# Patient Record
Sex: MALE | URBAN - METROPOLITAN AREA
[De-identification: names, ages, dates, MRNs, and addresses within clinical notes are randomized per-mention and may not be internally consistent; named-entity substitution may affect disease eponyms.]

---

## 2020-07-03 ENCOUNTER — NURSE ONLY (OUTPATIENT)
Dept: PRIMARY CARE CLINIC | Age: 45
End: 2020-07-03

## 2020-07-03 PROCEDURE — 99211 OFF/OP EST MAY X REQ PHY/QHP: CPT | Performed by: NURSE PRACTITIONER

## 2020-07-03 NOTE — PROGRESS NOTES
Bernell Kehr received a viral test for COVID-19. They were educated on isolation and quarantine as appropriate. For any symptoms, they were directed to seek care from their PCP, given contact information to establish with a doctor, directed to an urgent care or the emergency room.

## 2020-07-06 ENCOUNTER — TELEPHONE (OUTPATIENT)
Dept: PRIMARY CARE CLINIC | Age: 45
End: 2020-07-06

## 2020-07-06 NOTE — TELEPHONE ENCOUNTER
Patient left message for results  Results not available at this time  Returned call and left a message

## 2020-07-07 LAB
SARS-COV-2: DETECTED
SOURCE: ABNORMAL

## 2023-04-24 ENCOUNTER — TELEPHONE (OUTPATIENT)
Dept: ORTHOPEDIC SURGERY | Age: 48
End: 2023-04-24

## 2023-04-24 ENCOUNTER — OFFICE VISIT (OUTPATIENT)
Dept: ORTHOPEDIC SURGERY | Age: 48
End: 2023-04-24
Payer: COMMERCIAL

## 2023-04-24 DIAGNOSIS — M25.511 RIGHT SHOULDER PAIN, UNSPECIFIED CHRONICITY: ICD-10-CM

## 2023-04-24 DIAGNOSIS — S46.211A BICEPS TENDON RUPTURE, PROXIMAL, RIGHT, INITIAL ENCOUNTER: Primary | ICD-10-CM

## 2023-04-24 PROCEDURE — 99204 OFFICE O/P NEW MOD 45 MIN: CPT | Performed by: ORTHOPAEDIC SURGERY

## 2023-04-24 RX ORDER — MELOXICAM 15 MG/1
15 TABLET ORAL DAILY PRN
Qty: 30 TABLET | Refills: 1 | Status: SHIPPED | OUTPATIENT
Start: 2023-04-24

## 2023-04-24 NOTE — PROGRESS NOTES
concerning for partial subscap tear in addition to long head biceps partial tear. Plan: Pertinent imaging was reviewed. The etiology, natural history, and treatment options for the disorder were discussed. The roles of activity medication, antiinflammatories, injections, bracing, physical therapy, and surgical interventions were all described to the patient and questions were answered. We had a good discussion in clinic today with Mr. Danielson. Given his symptoms and lack of trial of therapy we do feel he stands to benefit from a trial of therapy and anti-inflammatory to see if this might assist in improving his symptoms. We will plan to follow-up with him in 2 to 3 weeks to see which way is progressing. We did discuss with him that should he not see a response to therapy there is a reasonable chance he may require surgical intervention in the future. We will see how he is progressing at next follow-up visit. Krysta Bunn is in agreement with this plan. All questions were answered to patient's satisfaction and was encouraged to call with any further questions. The patient was advised that NSAID-type medications have several potential side effects that include: gastrointestinal irritation including hemorrhage, renal injury, as well as an increased risk for heart attack and stroke. The patient was asked to take the medication with food and to stop if there is any symptoms of GI upset, including heartburn, nausea, increased gas or diarrhea. I asked the patient to contact their medical provider for vomiting, abdominal pain or black/bloody stools. The patient should have renal function testing per his medical provider periodically if the medication is taken on a regular basis. The patient should be alert for any swelling in the lower extremities and should stop taking the medication immediately and contact their medical provider should this occur.   In addition, the patient should stop taking the

## 2023-04-25 ENCOUNTER — HOSPITAL ENCOUNTER (OUTPATIENT)
Dept: PHYSICAL THERAPY | Age: 48
Setting detail: THERAPIES SERIES
Discharge: HOME | End: 2023-04-25
Payer: COMMERCIAL

## 2023-04-25 PROCEDURE — 97161 PT EVAL LOW COMPLEX 20 MIN: CPT | Performed by: PHYSICAL THERAPIST

## 2023-04-25 PROCEDURE — 97110 THERAPEUTIC EXERCISES: CPT | Performed by: PHYSICAL THERAPIST

## 2023-04-25 NOTE — FLOWSHEET NOTE
indicated by patients Functional Deficits. [] Progressing: [] Met: [] Not Met: [] Adjusted  4. Patient will return to  functional activities  reaching oer head behind back, don doff shirt coat without increased symptoms or restriction. [] Progressing: [] Met: [] Not Met: [] Adjusted               Overall Progression Towards Functional goals/ Treatment Progress Update:  [] Patient is progressing as expected towards functional goals listed. [] Progression is slowed due to complexities/Impairments listed. [] Progression has been slowed due to co-morbidities. [x] Plan just implemented, too soon to assess goals progression <30days   [] Goals require adjustment due to lack of progress  [] Patient is not progressing as expected and requires additional follow up with physician  [] Other    Prognosis for POC: [x] Good [] Fair  [] Poor      Patient requires continued skilled intervention: [x] Yes  [] No    Treatment/Activity Tolerance:  [x] Patient able to complete treatment  [] Patient limited by fatigue  [] Patient limited by pain     [] Patient limited by other medical complications  [] Other:                   Patient Education:   Reviewed diagnosis, POC, HEP and its importance. Access Code: [email or web address]  URL: [email or web address]  Date: 04/25/2023  Prepared by: Estephania Flannery     Exercises  - Doorway Pec Stretch at 90 Degrees Abduction  - 2 x daily - 7 x weekly - 3 reps - 30 hold  - Sidelying Thoracic Rotation with Open Book  - 2 x daily - 7 x weekly - 10 reps - 10 hold  - Shoulder External Rotation and Scapular Retraction with Resistance  - 1 x daily - 7 x weekly - 3 sets - 10 reps  - Supine Shoulder Horizontal Abduction with Resistance  - 1 x daily - 7 x weekly - 3 sets - 10 reps  - Standing Shoulder Diagonal Horizontal Abduction 60/120 Degrees with Resistance  - 1 x daily - 7 x weekly - 3 sets - 10 reps  - Prone Middle Trapezius with Legs Straight on Swiss Ball  - 1 x daily - 7 x weekly - 3

## 2023-04-25 NOTE — THERAPY EVALUATION
[facility] [address]  Phone [phone]  Fax [phone]      Physical Therapy Certification    Dear Janelle Sanchez MD,    We had the pleasure of evaluating the following patient for physical therapy services at [address] [place] [address]. A summary of our findings can be found in the initial assessment below. This includes our plan of care. If you have any questions or concerns regarding these findings, please do not hesitate to contact me at the office phone number checked above. Thank you for the referral.       Physician Signature:_______________________________Date:__________________  By signing above (or electronic signature), therapists plan is approved by physician      Patient: Myranda Tran   : 1975   MRN: [id number]  Referring Physician: Janelel Sanchez MD      Evaluation Date: 2023      Medical Diagnosis Information:  Diagnosis: S48.561Q (ICD-10-CM) - Biceps tendon rupture, proximal, right, initial encounter   Treatment Diagnosis: R shoulder pain              MRI dated 2023  Conclusion  Supraspinatus concealed interstitial delamination tear far anterior up to 1.5 x 1.5 cm involving the footprint. No fibrofatty infiltration of the muscle. Medially subluxed long biceps tendon into a laminar subscap intermediate grade tear.   Chronic SLAP lesion  Long biceps tendon intermediate grade interstitial tear as it courses from arcuate to vertical section  Capsulitis and bursitis                               Insurance information: PT Insurance Information: [employer]    Precautions/ Contra-indications: unremarkable     C-SSRS Triggered by Intake questionnaire (Past 2 wk assessment):   [x] No, Questionnaire did not trigger screening.   [] Yes, Patient intake triggered further evaluation      [] C-SSRS Screening completed  [] PCP notified via Plan of Care  [] Emergency services

## 2023-04-28 ENCOUNTER — APPOINTMENT (OUTPATIENT)
Dept: PHYSICAL THERAPY | Age: 48
End: 2023-04-28
Payer: COMMERCIAL

## 2024-07-18 ENCOUNTER — HOSPITAL ENCOUNTER (OUTPATIENT)
Dept: OCCUPATIONAL THERAPY | Age: 49
Setting detail: THERAPIES SERIES
Discharge: HOME | End: 2024-07-18
Payer: COMMERCIAL

## 2024-07-18 PROCEDURE — 97165 OT EVAL LOW COMPLEX 30 MIN: CPT

## 2024-07-18 PROCEDURE — 97537 COMMUNITY/WORK REINTEGRATION: CPT
